# Patient Record
Sex: MALE | Race: OTHER | Employment: FULL TIME | URBAN - METROPOLITAN AREA
[De-identification: names, ages, dates, MRNs, and addresses within clinical notes are randomized per-mention and may not be internally consistent; named-entity substitution may affect disease eponyms.]

---

## 2018-03-16 ENCOUNTER — OFFICE VISIT (OUTPATIENT)
Dept: FAMILY MEDICINE CLINIC | Facility: CLINIC | Age: 38
End: 2018-03-16

## 2018-03-16 VITALS
BODY MASS INDEX: 24.11 KG/M2 | WEIGHT: 150 LBS | HEART RATE: 78 BPM | HEIGHT: 66 IN | TEMPERATURE: 98 F | OXYGEN SATURATION: 98 % | RESPIRATION RATE: 18 BRPM | DIASTOLIC BLOOD PRESSURE: 70 MMHG | SYSTOLIC BLOOD PRESSURE: 120 MMHG

## 2018-03-16 DIAGNOSIS — H61.22 IMPACTED CERUMEN OF LEFT EAR: Primary | ICD-10-CM

## 2018-03-16 PROCEDURE — 69209 REMOVE IMPACTED EAR WAX UNI: CPT | Performed by: PHYSICIAN ASSISTANT

## 2018-03-16 NOTE — PATIENT INSTRUCTIONS
1.  Debrox (over the counter ear wax removal drops). Cera de oído retenida  La cera de oído retenida se produce por la acumulación de la cera natural del oído (cerumen). Es muy común que quede cera de oído retenida.  [place] puede causar síntomas, alfonso p Introducir objetos repetidas veces en el oído también pueden provocar la retención de cerumen. Por ejemplo, poner bastoncillos de algodón en el oído puede empujar el cerumen más adentro del oído. Con el paso del Radha, esto puede causar un bloqueo.  Los au Los proveedores de atención médica desaconsejan el uso de tian para [employer] oídos y de equipos de vacío para el oído. No está probado que estos métodos funcionen y pueden causar problemas.    Prevención de la retención de cerumen  Es posible que usted

## 2018-03-16 NOTE — PROGRESS NOTES
Zain Aguirre is a 41year old male who presents today c/o L sided cerumen impaction requesting cerumen removal.  Prior h/o similar symptoms requiring lavage, uses ear plugs at work.   Denies pain, no drainage, no lh/dizziness, no tinnitus, afebrile and wi

## 2020-06-22 ENCOUNTER — OFFICE VISIT (OUTPATIENT)
Dept: FAMILY MEDICINE CLINIC | Facility: CLINIC | Age: 40
End: 2020-06-22

## 2020-06-22 VITALS
HEIGHT: 66 IN | WEIGHT: 155 LBS | SYSTOLIC BLOOD PRESSURE: 142 MMHG | BODY MASS INDEX: 24.91 KG/M2 | DIASTOLIC BLOOD PRESSURE: 86 MMHG | HEART RATE: 67 BPM | TEMPERATURE: 98 F

## 2020-06-22 DIAGNOSIS — H61.20 CERUMEN IN AUDITORY CANAL ON EXAMINATION: Primary | ICD-10-CM

## 2020-06-22 DIAGNOSIS — H61.22 CERUMEN DEBRIS ON TYMPANIC MEMBRANE OF LEFT EAR: ICD-10-CM

## 2020-06-22 PROCEDURE — 99213 OFFICE O/P EST LOW 20 MIN: CPT | Performed by: NURSE PRACTITIONER

## 2020-06-22 NOTE — PROGRESS NOTES
HPI:    Patient ID: Diego Oconnor is a 45year old male. States here for usual ear cleaning from wax    Ear Wax   This is a recurrent problem. Pertinent negatives include no chills, congestion, fever or sore throat.  Exacerbated by: uses ear plugs fro w Skin: Skin is warm and dry. He is not diaphoretic. Psychiatric: He has a normal mood and affect. His behavior is normal. Judgment and thought content normal.   Vitals reviewed.   /86   Pulse 67   Temp 98.2 °F (36.8 °C) (Tympanic)   Ht 66\"   Wt 155 En el oído, hay glándulas diminutas que producen sustancias que, combinadas con las células muertas de la piel, alpesh el cerumen (la cera del oído). Dicha cera ayuda a proteger el canal auditivo del agua, la suciedad, las infecciones y las heridas.  Con el Generalmente, el exceso de cera en el oído no causa ningún síntoma, a menos que haya keven gran cantidad acumulada.  En cristi brody, puede causar los siguientes síntomas:   · pérdida de la audición;  · dolor de oído;  · sensación de llenura en el oído;  · Faythe Musty · Evite usar hisopos de algodón en el oído.   Cuando llamar al proveedor de atención médica  Llame al proveedor de atención médica de inmediato si presenta lo siguiente:   · síntomas de la retención de cerumen;  · síntomas graves luego de la extracción del

## 2020-06-22 NOTE — PATIENT INSTRUCTIONS
Must be seen right away for blood from ear,     Follow-up with primary care provider in 3-4 days if not improving, or sooner if worsening      Retención de cera en los oídos     Inner ear structures including ear canal and eardrum.    La retención de cera e oído también puede [employer] retención de cera en el oído. Por ejemplo, usar hisopos de algodón puede empujar el cerumen más adentro en el oído. Con el tiempo, esto puede causar un bloqueo.  Los [employer], los tapones para nadar y los tapones a medida pue funcionen y pueden causar problemas. Prevención de la retención de cerumen  Es posible que no pueda prevenir la retención de cerumen si tiene keven afección que lo provoca, por ejemplo, un eczema.  En otros casos, puede hacer lo siguiente para evitar la acu

## 2023-01-14 ENCOUNTER — OFFICE VISIT (OUTPATIENT)
Dept: FAMILY MEDICINE CLINIC | Facility: CLINIC | Age: 43
End: 2023-01-14

## 2023-01-14 VITALS
SYSTOLIC BLOOD PRESSURE: 132 MMHG | HEART RATE: 71 BPM | BODY MASS INDEX: 25.71 KG/M2 | DIASTOLIC BLOOD PRESSURE: 78 MMHG | TEMPERATURE: 98 F | HEIGHT: 66 IN | RESPIRATION RATE: 18 BRPM | WEIGHT: 160 LBS | OXYGEN SATURATION: 98 %

## 2023-01-14 DIAGNOSIS — H61.23 BILATERAL IMPACTED CERUMEN: Primary | ICD-10-CM

## 2023-01-14 PROCEDURE — 99212 OFFICE O/P EST SF 10 MIN: CPT | Performed by: NURSE PRACTITIONER

## 2023-01-14 NOTE — PATIENT INSTRUCTIONS
1. Ear hygiene as discussed. 2. Return to clinic, follow up with PCP, or go to the Emergency Department if you develop any ear pain, drainage, or if you have any concerns.

## 2023-01-14 NOTE — PROCEDURES
Reason(s) for procedure:  Bilateral impacted cerumen(primary encounter diagnosis)     Ear Exam:  Canal:  Right ear canal completely occluded with cerumen. Left ear canal completely occluded with cerumen. Tympanic Membrane:  Right TM not visualized due to cerumen impaction. Left TM not visualized due to cerumen impaction. Impacted Cerumen:  Bilat     Method of Removal:  Scoopette/irrigation     Cerumen impaction was completely removed. Pt tolerated well. Notes immediate return of normal hearing. Re-exam shows: Right ear: TM: intact and without erythema, no bulging, noretraction,noeffusion. No erythema or swelling noted to ear canal or external ear. No pain with manipulation of tragus or auricle. No pain with insertion of otoscope. Left ear: TM: intact and without erythema, no bulging, noretraction,noeffusion. No erythema or swelling noted to ear canal or external ear. No pain with manipulation of tragus or auricle. No pain with insertion of otoscope.

## 2024-12-30 ENCOUNTER — OFFICE VISIT (OUTPATIENT)
Dept: FAMILY MEDICINE CLINIC | Facility: CLINIC | Age: 44
End: 2024-12-30

## 2024-12-30 VITALS
RESPIRATION RATE: 16 BRPM | SYSTOLIC BLOOD PRESSURE: 122 MMHG | TEMPERATURE: 98 F | HEIGHT: 66 IN | WEIGHT: 154 LBS | BODY MASS INDEX: 24.75 KG/M2 | DIASTOLIC BLOOD PRESSURE: 80 MMHG | HEART RATE: 61 BPM | OXYGEN SATURATION: 100 %

## 2024-12-30 DIAGNOSIS — H61.22 IMPACTED CERUMEN OF LEFT EAR: Primary | ICD-10-CM

## 2024-12-30 NOTE — PROCEDURES
Reason(s) for procedure:  Impacted cerumen of left ear  (primary encounter diagnosis)     Ear Exam:  Canal:  Left ear canal completely occluded with cerumen.     Tympanic Membrane:  Left TM not visualized due to cerumen impaction.           Impacted Cerumen:  Left     Method of Removal:  Scoopette/irrigation     Cerumen impaction was completely removed. Pt tolerated well. Notes immediate return of normal hearing. Re-exam shows: Left ear: TM: intact and without erythema, no bulging, noretraction,noeffusion. No erythema or swelling noted to ear canal or external ear.  No pain with manipulation of tragus or auricle. No pain with insertion of otoscope.

## 2024-12-30 NOTE — PROGRESS NOTES
CHIEF COMPLAINT:     Chief Complaint   Patient presents with    Ear Problem     Mainly on left side, no pain but muffled hearing     HPI:   Francois Lopez is a non-toxic, well appearing 44 year old male who presents today with complaints of left fullness and muffled hearing.. Notes it has been present for the past 4-5 days. Denies fevers. Denies ear pain, drainage from ear or trauma to ear. Denies recent upper respiratory symptoms or recent swimming. Tolerates PO well at home. No n/v/d.  Denies any other aggravating or relieving factors at home. Denies any other treatment attempts prior to arrival.         Current Outpatient Medications   Medication Sig Dispense Refill    Neomycin-Polymyxin-HC [id number] Otic Solution Place 4 drops into both ears 4 (four) times daily. (Patient not taking: No sig reported) 1 Bottle 0      No past medical history on file.   Social History:  Social History     Socioeconomic History    Marital status: Single   Tobacco Use    Smoking status: Every Day     Current packs/day: 0.10     Types: Cigarettes    Smokeless tobacco: Never   Vaping Use    Vaping status: Never Used   Substance and Sexual Activity    Alcohol use: Yes     Alcohol/week: 0.0 standard drinks of alcohol        REVIEW OF SYSTEMS:   GENERAL: Denies fever. Notes good appetite.   SKIN: no unusual skin lesions or rashes  HEENT: See HPI  LUNGS: No cough, shortness of breath, or wheezing.  CARDIOVASCULAR: No chest pain, palpitations  GI: No N/V/C/D.  NEURO: denies headaches or dizziness    EXAM:   /80   Pulse 61   Temp 98 °F (36.7 °C)   Resp 16   Ht 5' 6\" (1.676 m)   Wt 154 lb (69.9 kg)   SpO2 100%   BMI 24.86 kg/m²   GENERAL: well developed, well nourished,in no apparent distress  SKIN: no rashes,no suspicious lesions  HEAD: atraumatic, normocephalic  EYES: conjunctiva clear  EARS: Right TM: intact and without erythema, no bulging, no retraction,noeffusion. No erythema or swelling noted to ear canal or external  ear.   Left ear canal completely occluded with cerumen.  NOSE: nostrils patent, no nasal mucus, nasal mucosa wnl  THROAT: oral mucosa pink, moist. Posterior pharynx not erythematous or injected. No exudates. No trismus. Uvula midline and without swelling. Voice normal/clear. No stridor.  NECK: supple, non-tender  LUNGS: clear to auscultation bilaterally, no wheezes or rhonchi. Breathing is non labored.  CARDIO: RRR without murmur  EXTREMITIES: no cyanosis, clubbing or edema  LYMPH: No lymphadenopathy.      ASSESSMENT AND PLAN:       ICD-10-CM    1. Impacted cerumen of left ear  H61.22 Cerumen Removal w/ Curette          Cerumen impaction was completely removed. Pt tolerated well. Notes immediate return of normal hearing. Re-exam shows: Left ear: TM: intact and without erythema, no bulging, noretraction,noeffusion. No erythema or swelling noted to ear canal or external ear.  No pain with manipulation of tragus or auricle. No pain with insertion of otoscope.         Discussed physical exam and hpi with pt. Pt has reassuring physical exam consistent with cerumen impaction that was successfully removed in office. He notes immediate return of normal hearing. No signs of OM or externa. Monitoring parameters and expected course outlined. Patient to return to clinic, call PCP or go to emergency department if he develops ear pain, drainage, or has any concerns. The patient agreed with the plan.               There are no Patient Instructions on file for this visit.
